# Patient Record
Sex: FEMALE | Race: WHITE | NOT HISPANIC OR LATINO | Employment: OTHER | URBAN - METROPOLITAN AREA
[De-identification: names, ages, dates, MRNs, and addresses within clinical notes are randomized per-mention and may not be internally consistent; named-entity substitution may affect disease eponyms.]

---

## 2017-02-06 ENCOUNTER — APPOINTMENT (OUTPATIENT)
Dept: MEDICAL GROUP | Facility: MEDICAL CENTER | Age: 62
End: 2017-02-06
Payer: COMMERCIAL

## 2017-06-15 ENCOUNTER — OFFICE VISIT (OUTPATIENT)
Dept: MEDICAL GROUP | Facility: PHYSICIAN GROUP | Age: 62
End: 2017-06-15
Payer: COMMERCIAL

## 2017-06-15 VITALS
HEART RATE: 58 BPM | SYSTOLIC BLOOD PRESSURE: 122 MMHG | BODY MASS INDEX: 22.13 KG/M2 | RESPIRATION RATE: 16 BRPM | HEIGHT: 67 IN | OXYGEN SATURATION: 95 % | TEMPERATURE: 99 F | WEIGHT: 141 LBS | DIASTOLIC BLOOD PRESSURE: 70 MMHG

## 2017-06-15 DIAGNOSIS — S83.511A RIGHT ACL TEAR: ICD-10-CM

## 2017-06-15 DIAGNOSIS — Z23 NEED FOR ZOSTER VACCINATION: ICD-10-CM

## 2017-06-15 DIAGNOSIS — M25.852 LEFT HIP IMPINGEMENT SYNDROME: ICD-10-CM

## 2017-06-15 DIAGNOSIS — Z01.419 WELL WOMAN EXAM: ICD-10-CM

## 2017-06-15 DIAGNOSIS — C50.919 MALIGNANT NEOPLASM OF FEMALE BREAST, UNSPECIFIED LATERALITY, UNSPECIFIED SITE OF BREAST: ICD-10-CM

## 2017-06-15 PROCEDURE — 99386 PREV VISIT NEW AGE 40-64: CPT | Performed by: FAMILY MEDICINE

## 2017-06-15 RX ORDER — CYCLOBENZAPRINE HCL 10 MG
10 TABLET ORAL 3 TIMES DAILY PRN
Qty: 30 TAB | Refills: 0 | Status: SHIPPED | OUTPATIENT
Start: 2017-06-15 | End: 2017-11-24

## 2017-06-15 ASSESSMENT — PATIENT HEALTH QUESTIONNAIRE - PHQ9: CLINICAL INTERPRETATION OF PHQ2 SCORE: 2

## 2017-06-15 ASSESSMENT — ENCOUNTER SYMPTOMS
CARDIOVASCULAR NEGATIVE: 1
PSYCHIATRIC NEGATIVE: 1
PALPITATIONS: 0
MYALGIAS: 0
CHILLS: 0
DIZZINESS: 0
FEVER: 0
BACK PAIN: 1
CONSTITUTIONAL NEGATIVE: 1
EYES NEGATIVE: 1
NECK PAIN: 0
RESPIRATORY NEGATIVE: 1
GASTROINTESTINAL NEGATIVE: 1
NEUROLOGICAL NEGATIVE: 1
CONSTIPATION: 0
COUGH: 0
HEADACHES: 0
HEMOPTYSIS: 0

## 2017-06-15 NOTE — MR AVS SNAPSHOT
"        Marielle Steele   6/15/2017 9:00 AM   Office Visit   MRN: [id number]    Department:  [facility]    Dept Phone:  [phone]    Description:  Female : 1955   Provider:  Werner Bhakta M.D.           Reason for Visit     Establish Care     Referral Needed           Allergies as of 6/15/2017     Allergen Noted Reactions    Lanolin 2015   Rash    rash    Tetracycline 2009   Rash    Latex 2015       Irritates skin      You were diagnosed with     Right ACL tear   [7125395]       Malignant neoplasm of female breast, unspecified laterality, unspecified site of breast (CMS-HCC)   [6925441]       Well woman exam   [092640]       Need for zoster vaccination   [[id number]]       Left hip impingement syndrome   [0922205]         Vital Signs     Blood Pressure Pulse Temperature Respirations Height Weight    122/70 mmHg 58 37.2 °C (99 °F) 16 1.702 m (5' 7.01\") 63.957 kg (141 lb)    Body Mass Index Oxygen Saturation Smoking Status             22.08 kg/m2 95% Never Smoker          Basic Information     Date Of Birth Sex Race Ethnicity Preferred Language    1955 Female White Non- English      Problem List              ICD-10-CM Priority Class Noted - Resolved    GERD (gastroesophageal reflux disease) K21.9   2015 - Present    Post menopausal syndrome N95.1   2015 - Present    Left hip impingement syndrome M25.852   2016 - Present      Health Maintenance        Date Due Completion Dates    IMM ZOSTER VACCINE 2015 ---    COLONOSCOPY 2017 (Done)    Override on 2007: Done    MAMMOGRAM 2017, 2016, 2015, 2013, 2012, 2010, 2010, 3/13/2009, 3/13/2009    PAP SMEAR 2018 (Done)    Override on 2015: Done    IMM DTaP/Tdap/Td Vaccine (2 - Td) 2026            Current Immunizations     Tdap Vaccine 2016 11:06 AM      Below and/or attached are the medications your provider " expects you to take. Review all of your home medications and newly ordered medications with your provider and/or pharmacist. Follow medication instructions as directed by your provider and/or pharmacist. Please keep your medication list with you and share with your provider. Update the information when medications are discontinued, doses are changed, or new medications (including over-the-counter products) are added; and carry medication information at all times in the event of emergency situations     Allergies:  LANOLIN - Rash     TETRACYCLINE - Rash     LATEX - (reactions not documented)               Medications  Valid as of: Yenny 15, 2017 -  9:12 AM    Generic Name Brand Name Tablet Size Instructions for use    Boswellia-Glucosamine-Vit D   Take  by mouth.        Calcium Carbonate-Vit D-Min   Take 1,500 mg by mouth every day.        Cholecalciferol   Take  by mouth.        Coenzyme Q10 (Cap) coenzyme Q-10 30 MG Take 60 mg by mouth every day.        Cyanocobalamin (Tab) VITAMIN B-12 500 MCG Take 500 mcg by mouth every day.        Cyclobenzaprine HCl (Tab) FLEXERIL 10 MG Take 1 Tab by mouth 3 times a day as needed.        Indomethacin (Cap CR) INDOCIN SR 75 MG Take 1 Cap by mouth every day. For 15 days        Multiple Vitamins-Minerals   Take  by mouth every day.        Zoster Vaccine Live (Recon Soln) ZOSTAVAX 54449 UNT/0.65ML Inject 0.65 mL as instructed Once for 1 dose.        .                 Medicines prescribed today were sent to:     [id number] - [place], [place] - [address] [facility] [address] [address] & [address] [facility] [address]    Phone: [phone] Fax: [phone]    Open 24 Hours?: No    [facility] - [place] [facility], [place] - [address]    [address] [address]    Phone: [phone] Fax: [phone]    Open 24 Hours?: No      Medication refill instructions:       If your prescription bottle  indicates you have medication refills left, it is not necessary to call your provider’s office. Please contact your pharmacy and they will refill your medication.    If your prescription bottle indicates you do not have any refills left, you may request refills at any time through one of the following ways: The online [employer] system (except Urgent Care), by calling your provider’s office, or by asking your pharmacy to contact your provider’s office with a refill request. Medication refills are processed only during regular business hours and may not be available until the next business day. Your provider may request additional information or to have a follow-up visit with you prior to refilling your medication.   *Please Note: Medication refills are assigned a new Rx number when refilled electronically. Your pharmacy may indicate that no refills were authorized even though a new prescription for the same medication is available at the pharmacy. Please request the medicine by name with the pharmacy before contacting your provider for a refill.        Your To Do List     Future Labs/Procedures Complete By Expires    MA-SCREEN MAMMO W/CAD-BILAT  As directed 6/15/2018      Referral     A referral request has been sent to our patient care coordination department. Please allow 3-5 business days for us to process this request and contact you either by phone or mail. If you do not hear from us by the 5th business day, please call us at [phone].           [employer] Access Code: Activation code not generated  Current [employer] Status: Active

## 2017-06-15 NOTE — PROGRESS NOTES
Subjective:      Marielle Steele is a 62 y.o. female who presents with Establish Care and Referral Needed            HPI Comments: New patient visit, has a history of breast cancer lump removal last year with 6 weeks of radiation, needs referral for f/u mammo and to see her onc  Has new insurance so needs new referral  1. Right ACL tear  In the past has done pt and doing ok    2. Malignant neoplasm of female breast, unspecified laterality, unspecified site of breast (CMS-HCC)    - REFERRAL TO HEMATOLOGY ONCOLOGY Referral to?: Other  - MA-SCREEN MAMMO W/CAD-BILAT; Future    3. Well woman exam    - REFERRAL TO GYNECOLOGY  - REFERRAL TO GASTROENTEROLOGY    4. Need for zoster vaccination    - zoster vaccine live, PF, (ZOSTAVAX) 22474 UNT/0.65ML injection; Inject 0.65 mL as instructed Once for 1 dose.  Dispense: 0.65 mL; Refill: 0    5. Left hip impingement syndrome  After pulling on saddle would like some muscle relaxers  - cyclobenzaprine (FLEXERIL) 10 MG Tab; Take 1 Tab by mouth 3 times a day as needed.  Dispense: 30 Tab; Refill: 0    Past Medical History:    Allergy                                                       Arthritis                                                     Heart murmur                                                  Fracture                                        1971            Comment:right elbow,   2006 left wrist    Carcinoma in situ of respiratory system                       Cancer (CMS-HCC)                                                Comment:right breast-just finished radiation    Heart burn                                                    Indigestion                                                 Past Surgical History:    LUMBAR DECOMPRESSION                             1993          MAMMOPLASTY AUGMENTATION                         1998          RHYTIDECTOMY                                     1/6/2009        Comment:Performed by R[facility]LAND SHAH at [facility]    BLEPHAROPLASTY                                   1/6/2009        Comment:Performed by RYLAND SHAH at SURGERY [facility]    [place] ENLARGE BREAST WITH IMPLANT                                   Comment:saline    HIP ARTHROSCOPY                                 Left 11/30/2016      Comment:Procedure: LEFT HIP ARTHROSCOPY, BONE SPUR                REMOVAL, LABRUM REPAIR ;  Surgeon: Bam Young M.D.;  Location: SURGERY [facility];                 Service:     Smoking Status: Never Smoker                      Smokeless Status: Never Used                        Alcohol Use: Yes                Comment: occasionally    Review of patient's family history indicates:    Other                          Mother                    GI                             Father                      Comment: Complications of cdiff    Diabetes                       Maternal Grandmother        Current outpatient prescriptions: •  zoster vaccine live, PF, (ZOSTAVAX) 52016 UNT/0.65ML injection, Inject 0.65 mL as instructed Once for 1 dose., Disp: 0.65 mL, Rfl: 0•  cyclobenzaprine (FLEXERIL) 10 MG Tab, Take 1 Tab by mouth 3 times a day as needed., Disp: 30 Tab, Rfl: 0•  indomethacin SR (INDOCIN SR) 75 MG Cap CR, Take 1 Cap by mouth every day. For 15 days, Disp: 15 Cap, Rfl: 0•  Calcium Carbonate-Vit D-Min (CALCIUM 1200 PO), Take 1,500 mg by mouth every day., Disp: , Rfl: •  cyanocobalamin (VITAMIN B-12) 500 MCG Tab, Take 500 mcg by mouth every day., Disp: , Rfl: •  Boswellia-Glucosamine-Vit D (GLUCOSAMINE COMPLEX PO), Take  by mouth., Disp: , Rfl: •  Cholecalciferol (VITAMIN D PO), Take  by mouth., Disp: , Rfl: •  coenzyme Q-10 30 MG capsule, Take 60 mg by mouth every day., Disp: , Rfl: •  DAILY MULTIVITAMIN PO, Take  by mouth every day., Disp: , Rfl:           Review of Systems   Constitutional: Negative.  Negative for fever and chills.        Past Medical History:    Allergy                                                        Arthritis                                                     Heart murmur                                                  Fracture                                        1971            Comment:right elbow,   2006 left wrist    Carcinoma in situ of respiratory system                       Cancer (CMS-HCC)                                                Comment:right breast-just finished radiation    Heart burn                                                    Indigestion                                                 Past Surgical History:    LUMBAR DECOMPRESSION                             1993          MAMMOPLASTY AUGMENTATION                         1998          RHYTIDECTOMY                                     1/6/2009        Comment:Performed by RYLAND SHAH at SURGERY [facility]    BLEPHAROPLASTY                                   1/6/2009        Comment:Performed by RYLAND SHAH at SURGERY [facility]    [place] ENLARGE BREAST WITH IMPLANT                                   Comment:saline    HIP ARTHROSCOPY                                 Left 11/30/2016      Comment:Procedure: LEFT HIP ARTHROSCOPY, BONE SPUR                REMOVAL, LABRUM REPAIR ;  Surgeon: Bam Young M.D.;  Location: SURGERY [facility];                 Service:     Smoking Status: Never Smoker                      Smokeless Status: Never Used                        Alcohol Use: Yes                Comment: occasionally    Review of patient's family history indicates:    Other                          Mother                    GI                             Father                      Comment: Complications of cdiff    Diabetes                       Maternal Grandmother       HENT: Negative.    Eyes: Negative.    Respiratory: Negative.  Negative for cough and hemoptysis.    Cardiovascular: Negative.  Negative for chest pain and palpitations.    "  Gastrointestinal: Negative.  Negative for constipation.   Genitourinary: Negative.  Negative for dysuria and urgency.   Musculoskeletal: Positive for back pain and joint pain. Negative for myalgias and neck pain.   Skin: Negative.  Negative for rash.   Neurological: Negative.  Negative for dizziness and headaches.   Endo/Heme/Allergies: Negative.    Psychiatric/Behavioral: Negative.  Negative for suicidal ideas.          Objective:     /70 mmHg  Pulse 58  Temp(Src) 37.2 °C (99 °F)  Resp 16  Ht 1.702 m (5' 7.01\")  Wt 63.957 kg (141 lb)  BMI 22.08 kg/m2  SpO2 95%     Physical Exam   Constitutional: She is oriented to person, place, and time. No distress.   HENT:   Head: Normocephalic and atraumatic.   Right Ear: External ear normal.   Left Ear: External ear normal.   Nose: Nose normal.   Mouth/Throat: Oropharynx is clear and moist. No oropharyngeal exudate.   Eyes: Pupils are equal, round, and reactive to light. Right eye exhibits no discharge. Left eye exhibits no discharge. No scleral icterus.   Neck: Normal range of motion. Neck supple. No JVD present. No tracheal deviation present. No thyromegaly present.   Cardiovascular: Normal rate, regular rhythm, normal heart sounds and intact distal pulses.  Exam reveals no gallop and no friction rub.    No murmur heard.  Pulmonary/Chest: Effort normal and breath sounds normal. No stridor. No respiratory distress. She has no wheezes. She has no rales. She exhibits no tenderness.   Abdominal: Soft. She exhibits no distension. There is no tenderness.   Musculoskeletal:        Lumbar back: She exhibits pain.        Back:    Lymphadenopathy:     She has no cervical adenopathy.   Neurological: She is alert and oriented to person, place, and time.   Skin: Skin is warm and dry. She is not diaphoretic.   Psychiatric: Judgment normal.   Nursing note and vitals reviewed.              Assessment/Plan:     1. Right ACL tear      2. Malignant neoplasm of female breast, " unspecified laterality, unspecified site of breast (CMS-HCC)    - REFERRAL TO HEMATOLOGY ONCOLOGY Referral to?: Other  - MA-SCREEN MAMMO W/CAD-BILAT; Future    3. Well woman exam    - REFERRAL TO GYNECOLOGY  - REFERRAL TO GASTROENTEROLOGY    4. Need for zoster vaccination    - zoster vaccine live, PF, (ZOSTAVAX) 35871 UNT/0.65ML injection; Inject 0.65 mL as instructed Once for 1 dose.  Dispense: 0.65 mL; Refill: 0    5. Left hip impingement syndrome    - cyclobenzaprine (FLEXERIL) 10 MG Tab; Take 1 Tab by mouth 3 times a day as needed.  Dispense: 30 Tab; Refill: 0

## 2017-09-06 ENCOUNTER — TELEPHONE (OUTPATIENT)
Dept: MEDICAL GROUP | Facility: PHYSICIAN GROUP | Age: 62
End: 2017-09-06

## 2017-09-06 NOTE — TELEPHONE ENCOUNTER
2. SPECIFIC Action To Be Taken: Pt would like to get referrals for the following places.     Oncologist Dr tovar     And for ortho mary referral for smita randhawa    3. Diagnosis/Clinical Reason for Request:      C50.919 Malignant neoplasm of female breast, unspecified laterality,     unspecified site of breast      And for ortho M16.12 (ICD-10-CM) - Primary osteoarthritis of left hip        4. Specialty & Provider Name/Lab/Imaging Location: Places     5. Is appointment scheduled for requested order/referral: yes - 10/31/17    Patient informed they will receive a return phone call from the office ONLY if there are any questions before processing their request. Advised to call back if they haven't received a call from the referral department in 5 days.

## 2017-11-27 ENCOUNTER — TELEPHONE (OUTPATIENT)
Dept: URGENT CARE | Facility: CLINIC | Age: 62
End: 2017-11-27

## 2017-11-27 DIAGNOSIS — R31.9 HEMATURIA, UNSPECIFIED TYPE: ICD-10-CM

## 2017-11-27 NOTE — TELEPHONE ENCOUNTER
Please notify patient of negative urine culture; no UTI.  May discontinue Rx Bactrim.  Need F/U PCP or urology (referral sent) as soon as available.

## 2017-11-29 ENCOUNTER — OFFICE VISIT (OUTPATIENT)
Dept: MEDICAL GROUP | Facility: PHYSICIAN GROUP | Age: 62
End: 2017-11-29
Payer: COMMERCIAL

## 2017-11-29 VITALS
HEART RATE: 62 BPM | TEMPERATURE: 98.6 F | DIASTOLIC BLOOD PRESSURE: 60 MMHG | SYSTOLIC BLOOD PRESSURE: 110 MMHG | WEIGHT: 144 LBS | RESPIRATION RATE: 14 BRPM | HEIGHT: 67 IN | OXYGEN SATURATION: 100 % | BODY MASS INDEX: 22.6 KG/M2

## 2017-11-29 DIAGNOSIS — Z87.440 HISTORY OF UTI: ICD-10-CM

## 2017-11-29 DIAGNOSIS — M25.852 LEFT HIP IMPINGEMENT SYNDROME: ICD-10-CM

## 2017-11-29 DIAGNOSIS — Z01.810 PREOP CARDIOVASCULAR EXAM: ICD-10-CM

## 2017-11-29 DIAGNOSIS — Z00.00 WELL ADULT EXAM: ICD-10-CM

## 2017-11-29 PROCEDURE — 99214 OFFICE O/P EST MOD 30 MIN: CPT | Performed by: FAMILY MEDICINE

## 2017-11-29 RX ORDER — CIPROFLOXACIN 500 MG/1
500 TABLET, FILM COATED ORAL 2 TIMES DAILY
Qty: 10 TAB | Refills: 0 | Status: SHIPPED | OUTPATIENT
Start: 2017-11-29 | End: 2017-12-04

## 2017-11-29 ASSESSMENT — ENCOUNTER SYMPTOMS
PSYCHIATRIC NEGATIVE: 1
COUGH: 0
GASTROINTESTINAL NEGATIVE: 1
HEADACHES: 0
NEUROLOGICAL NEGATIVE: 1
RESPIRATORY NEGATIVE: 1
FEVER: 0
NECK PAIN: 0
CARDIOVASCULAR NEGATIVE: 1
MYALGIAS: 0
PALPITATIONS: 0
CHILLS: 0
DIZZINESS: 0
EYES NEGATIVE: 1
CONSTITUTIONAL NEGATIVE: 1
HEMOPTYSIS: 0
CONSTIPATION: 0

## 2017-11-29 NOTE — PROGRESS NOTES
Subjective:      Marielle Steele is a 62 y.o. female who presents with Medical Clearance (hip replacement )            1. Left hip impingement syndrome  Continued pain and scheduled for a hip replacement from ortho    2. Preop cardiovascular exam  ekg normal  Labs ok except for blood and leukocytes in urine but had a uti for which she was treated, today urine dip normal  Last surgery was last year and no issues with anesthesia  No current cp or sob  No family history of malignant hyperthemia      3. History of UTI  Seen in  last week and given 3 days of bactrim  Still with leukocytes and blood in urine today will treat with cipro and have her give another sample next Monday to be clear for surgery on Wednesday      If on Monday her urine is clear we will have her ok for surgery    Past Medical History:  No date: Allergy  No date: Arthritis  No date: Cancer (CMS-[facility])      Comment: right breast-just finished radiation  No date: Carcinoma in situ of respiratory system  1971: Fracture      Comment: right elbow,   2006 left wrist  No date: Heart burn  No date: Heart murmur  No date: Indigestion  No date: Snoring  Past Surgical History:  11/30/2016: HIP ARTHROSCOPY Left      Comment: Procedure: LEFT HIP ARTHROSCOPY, BONE SPUR                REMOVAL, LABRUM REPAIR ;  Surgeon: Bam Young M.D.;  Location: SURGERY [facility];                 Service:   1/6/2009: RHYTIDECTOMY      Comment: Performed by RYLAND SHAH at SURGERY [facility]  1/6/2009: BLEPHAROPLASTY      Comment: Performed by R[facility]LAND SHAH at [facility]  1998: MAMMOPLASTY AUGMENTATION  1993: LUMBAR DECOMPRESSION  No date: PB ENLARGE BREAST WITH IMPLANT      Comment: saline  Smoking status: Never Smoker                                                              Smokeless tobacco: Never Used                      Alcohol use: Yes              Comment: occasionally    Review of  patient's family history indicates:    Other                          Mother                    GI                             Father                      Comment: Complications of cdiff    Diabetes                       Maternal Grandmother        No current outpatient prescriptions on file.    Patient was instructed on the use of medications, either prescriptions or OTC and informed on when the appropriate follow up time period should be. In addition, patient was also instructed that should any acute worsening occur that they should notify this clinic asap or call 911.            Review of Systems   Constitutional: Negative.  Negative for chills and fever.        Past Medical History:  No date: Allergy  No date: Arthritis  No date: Cancer (CMS-[facility])      Comment: right breast-just finished radiation  No date: Carcinoma in situ of respiratory system  1971: Fracture      Comment: right elbow,   2006 left wrist  No date: Heart burn  No date: Heart murmur  No date: Indigestion  No date: Snoring  Past Surgical History:  11/30/2016: HIP ARTHROSCOPY Left      Comment: Procedure: LEFT HIP ARTHROSCOPY, BONE SPUR                REMOVAL, LABRUM REPAIR ;  Surgeon: Bam Young M.D.;  Location: SURGERY [facility];                 Service:   1/6/2009: RHYTIDECTOMY      Comment: Performed by RYLAND SHAH at SURGERY [facility]  1/6/2009: BLEPHAROPLASTY      Comment: Performed by RYLAND SAHH at [facility]  1998: MAMMOPLASTY AUGMENTATION  1993: LUMBAR DECOMPRESSION  No date: PB ENLARGE BREAST WITH IMPLANT      Comment: saline  Smoking status: Never Smoker                                                              Smokeless tobacco: Never Used                      Alcohol use: Yes              Comment: occasionally    Review of patient's family history indicates:    Other                          Mother                    GI                              "Father                      Comment: Complications of cdiff    Diabetes                       Maternal Grandmother       HENT: Negative.    Eyes: Negative.    Respiratory: Negative.  Negative for cough and hemoptysis.    Cardiovascular: Negative.  Negative for chest pain and palpitations.   Gastrointestinal: Negative.  Negative for constipation.   Genitourinary: Negative.  Negative for dysuria and urgency.   Musculoskeletal: Positive for joint pain. Negative for myalgias and neck pain.   Skin: Negative.  Negative for rash.   Neurological: Negative.  Negative for dizziness and headaches.   Endo/Heme/Allergies: Negative.    Psychiatric/Behavioral: Negative.  Negative for suicidal ideas.          Objective:     /60   Pulse 62   Temp 37 °C (98.6 °F)   Resp 14   Ht 1.702 m (5' 7\")   Wt 65.3 kg (144 lb)   SpO2 100%   BMI 22.55 kg/m²      Physical Exam   Constitutional: She is oriented to person, place, and time. She appears well-developed and well-nourished. No distress.   HENT:   Head: Normocephalic and atraumatic.   Mouth/Throat: Oropharynx is clear and moist. No oropharyngeal exudate.   Eyes: Pupils are equal, round, and reactive to light.   Cardiovascular: Normal rate, regular rhythm, normal heart sounds and intact distal pulses.  Exam reveals no gallop and no friction rub.    No murmur heard.  Pulmonary/Chest: Effort normal and breath sounds normal. No respiratory distress. She has no wheezes. She has no rales. She exhibits no tenderness.   Musculoskeletal:        Left hip: She exhibits decreased range of motion.   Neurological: She is alert and oriented to person, place, and time.   Skin: She is not diaphoretic.   Psychiatric: She has a normal mood and affect. Her behavior is normal. Judgment and thought content normal.   Nursing note and vitals reviewed.              Assessment/Plan:     1. Left hip impingement syndrome      2. Preop cardiovascular exam      3. History of UTI        "

## 2017-12-04 ENCOUNTER — HOSPITAL ENCOUNTER (OUTPATIENT)
Facility: MEDICAL CENTER | Age: 62
End: 2017-12-04
Attending: NURSE PRACTITIONER
Payer: COMMERCIAL

## 2017-12-04 ENCOUNTER — OFFICE VISIT (OUTPATIENT)
Dept: MEDICAL GROUP | Facility: PHYSICIAN GROUP | Age: 62
End: 2017-12-04
Payer: COMMERCIAL

## 2017-12-04 VITALS
HEART RATE: 72 BPM | TEMPERATURE: 98 F | OXYGEN SATURATION: 99 % | HEIGHT: 67 IN | RESPIRATION RATE: 14 BRPM | DIASTOLIC BLOOD PRESSURE: 62 MMHG | SYSTOLIC BLOOD PRESSURE: 138 MMHG | BODY MASS INDEX: 22.6 KG/M2 | WEIGHT: 144 LBS

## 2017-12-04 DIAGNOSIS — N30.00 ACUTE CYSTITIS WITHOUT HEMATURIA: ICD-10-CM

## 2017-12-04 PROBLEM — N30.01 ACUTE CYSTITIS WITH HEMATURIA: Status: ACTIVE | Noted: 2017-12-04

## 2017-12-04 LAB
APPEARANCE UR: CLEAR
BILIRUB UR STRIP-MCNC: NORMAL MG/DL
COLOR UR AUTO: YELLOW
GLUCOSE UR STRIP.AUTO-MCNC: NORMAL MG/DL
KETONES UR STRIP.AUTO-MCNC: NORMAL MG/DL
LEUKOCYTE ESTERASE UR QL STRIP.AUTO: NORMAL
NITRITE UR QL STRIP.AUTO: NORMAL
PH UR STRIP.AUTO: 5 [PH] (ref 5–8)
PROT UR QL STRIP: NORMAL MG/DL
RBC UR QL AUTO: NORMAL
SP GR UR STRIP.AUTO: 1.01
UROBILINOGEN UR STRIP-MCNC: NORMAL MG/DL

## 2017-12-04 PROCEDURE — 87086 URINE CULTURE/COLONY COUNT: CPT

## 2017-12-04 PROCEDURE — 81002 URINALYSIS NONAUTO W/O SCOPE: CPT | Performed by: NURSE PRACTITIONER

## 2017-12-04 PROCEDURE — 99213 OFFICE O/P EST LOW 20 MIN: CPT | Performed by: NURSE PRACTITIONER

## 2017-12-04 ASSESSMENT — ENCOUNTER SYMPTOMS
BACK PAIN: 0
FEVER: 0
FLANK PAIN: 0
NAUSEA: 0
SPUTUM PRODUCTION: 0
CHILLS: 0
PALPITATIONS: 0
ABDOMINAL PAIN: 0
WHEEZING: 0
VOMITING: 0
SHORTNESS OF BREATH: 0
COUGH: 0
DIZZINESS: 0

## 2017-12-04 NOTE — PROGRESS NOTES
"Subjective:   Marielle Steele is a 62 y.o. female here today for follow up on UTI>     No problem-specific Assessment & Plan notes found for this encounter.     UTI   This is a new problem. Pertinent negatives include no abdominal pain, chills, fever, nausea, urinary symptoms or vomiting.       Current medicines (including changes today)  No current outpatient prescriptions on file.     No current facility-administered medications for this visit.      She  has a past medical history of Allergy; Arthritis; Cancer (CMS-HCC); Carcinoma in situ of respiratory system; Fracture (1971); Heart burn; Heart murmur; Indigestion; and Snoring. She also has no past medical history of Anesthesia; Anginal syndrome (CMS-HCC); Arrhythmia; Asthma; At risk for sleep apnea; Blood clotting disorder (CMS-HCC); Bowel habit changes; Breast cancer (CMS-HCC); Breath shortness; Bronchitis; Cataract; Cold; Congestive heart failure (CMS-HCC); Continuous ambulatory peritoneal dialysis status (CMS-HCC); COPD (chronic obstructive pulmonary disease) (CMS-HCC); Coughing blood; Dental disorder; Diabetes (CMS-HCC); Dialysis patient (CMS-HCC); Disorder of thyroid; Emphysema of lung (CMS-HCC); Glaucoma; Gynecological disorder; Heart valve disease; Hemorrhagic disorder (CMS-HCC); Hepatitis A; Hepatitis B; Hepatitis C; Hiatus hernia syndrome; High cholesterol; Hypertension; Infectious disease; Jaundice; Myocardial infarct; Pacemaker; Pneumonia; Pregnant; Psychiatric problem; Renal disorder; Rheumatic fever; Seizure (CMS-HCC); Sleep apnea; Stroke (CMS-HCC); Tuberculosis; Urinary bladder disorder; or Urinary incontinence.    Review of Systems   Constitutional: Negative for chills and fever.   Gastrointestinal: Negative for abdominal pain, nausea and vomiting.            Objective:     Blood pressure 138/62, pulse 72, temperature 36.7 °C (98 °F), resp. rate 14, height 1.702 m (5' 7\"), weight 65.3 kg (144 lb), SpO2 99 %. Body mass index is 22.55 kg/m². " ***    Physical Exam      Assessment and Plan:   The following treatment plan was discussed    1. Acute cystitis without hematuria  ***  - POCT Urinalysis  - URINE CULTURE(NEW); Future      Followup: No Follow-up on file.

## 2017-12-04 NOTE — PROGRESS NOTES
Subjective:   Marielle Steele is a 62 y.o. female here today for follow up on UTI.    Acute cystitis with hematuria  She was last seen on 11/29/17 by Dr Bhakta for preoperative clearance for hip replacement. Upon exam, she was noted to have leukocytes and blood present with U/A. She was treated with Cipro 500mg BID x 5 days. Today she states that she has completed full antibiotic course. She denies symptoms of dysuria, frequency, urgency, fever, chills, hematuria, or abdominal/fank pain. She does state that her surgery was rescheduled for 12/27/17.      Current medicines (including changes today)  No current outpatient prescriptions on file.     No current facility-administered medications for this visit.      She  has a past medical history of Allergy; Arthritis; Cancer (CMS-HCC); Carcinoma in situ of respiratory system; Fracture (1971); Heart burn; Heart murmur; Indigestion; and Snoring. She also has no past medical history of Anesthesia; Anginal syndrome (CMS-HCC); Arrhythmia; Asthma; At risk for sleep apnea; Blood clotting disorder (CMS-HCC); Bowel habit changes; Breast cancer (CMS-HCC); Breath shortness; Bronchitis; Cataract; Cold; Congestive heart failure (CMS-HCC); Continuous ambulatory peritoneal dialysis status (CMS-HCC); COPD (chronic obstructive pulmonary disease) (CMS-HCC); Coughing blood; Dental disorder; Diabetes (CMS-HCC); Dialysis patient (CMS-HCC); Disorder of thyroid; Emphysema of lung (CMS-HCC); Glaucoma; Gynecological disorder; Heart valve disease; Hemorrhagic disorder (CMS-HCC); Hepatitis A; Hepatitis B; Hepatitis C; Hiatus hernia syndrome; High cholesterol; Hypertension; Infectious disease; Jaundice; Myocardial infarct; Pacemaker; Pneumonia; Pregnant; Psychiatric problem; Renal disorder; Rheumatic fever; Seizure (CMS-[facility]); Sleep apnea; Stroke (CMS-HCC); Tuberculosis; Urinary bladder disorder; or Urinary incontinence.    Social History     Social History   • Marital status:       "Spouse name: N/A   • Number of children: N/A   • Years of education: N/A     Occupational History   • Not on file.     Social History Main Topics   • Smoking status: Never Smoker   • Smokeless tobacco: Never Used   • Alcohol use Yes      Comment: occasionally   • Drug use: No   • Sexual activity: Yes     Partners: Male     Birth control/ protection: Post-Menopausal     Other Topics Concern   • Not on file     Social History Narrative   • No narrative on file       Review of Systems   Constitutional: Negative for chills, fever and malaise/fatigue.   Respiratory: Negative for cough, sputum production, shortness of breath and wheezing.    Cardiovascular: Negative for chest pain, palpitations and leg swelling.   Genitourinary: Negative for dysuria, flank pain, frequency, hematuria and urgency.   Musculoskeletal: Negative for back pain.   Neurological: Negative for dizziness.        Objective:     Blood pressure 138/62, pulse 72, temperature 36.7 °C (98 °F), resp. rate 14, height 1.702 m (5' 7\"), weight 65.3 kg (144 lb), SpO2 99 %. Body mass index is 22.55 kg/m².     Physical Exam   Constitutional: She is oriented to person, place, and time and well-developed, well-nourished, and in no distress. No distress.   HENT:   Head: Normocephalic and atraumatic.   Eyes: Conjunctivae and EOM are normal. Pupils are equal, round, and reactive to light.   Neck: Normal range of motion. Neck supple.   Cardiovascular: Normal rate, normal heart sounds and intact distal pulses.  Exam reveals no friction rub.    No murmur heard.  Pulmonary/Chest: Effort normal and breath sounds normal. No respiratory distress. She has no wheezes.   Abdominal: There is no CVA tenderness.   Musculoskeletal: Normal range of motion.   Neurological: She is alert and oriented to person, place, and time. Gait normal.   Skin: Skin is warm and dry.   Psychiatric: Mood, memory, affect and judgment normal.       Assessment and Plan:   The following treatment plan was " discussed    1. Acute cystitis without hematuria  U/A results negative for leukocytes, nitrates, or blood. Will send urine for culture and advised that if results are negative for bacteria, patient will be cleared for surgery. Encouraged to consume at least 2L of water daily, maintain proper feminine hygiene, and void following intercourse.   - POCT Urinalysis  - URINE CULTURE(NEW); Future    Followup: Return if symptoms worsen or fail to improve.

## 2017-12-04 NOTE — ASSESSMENT & PLAN NOTE
She was last seen on 11/29/17 by Dr Bhakta for preoperative clearance for hip replacement. Upon exam, she was noted to have leukocytes and blood present with U/A. She was treated with Cipro 500mg BID x 5 days. Today she states that she has completed full antibiotic course. She denies symptoms of dysuria, frequency, urgency, fever, chills, hematuria, or abdominal/fank pain. She does state that her surgery was rescheduled for 12/27/17.

## 2017-12-06 ENCOUNTER — TELEPHONE (OUTPATIENT)
Dept: MEDICAL GROUP | Facility: PHYSICIAN GROUP | Age: 62
End: 2017-12-06

## 2017-12-06 DIAGNOSIS — R31.9 HEMATURIA, UNSPECIFIED TYPE: ICD-10-CM

## 2017-12-06 LAB
BACTERIA UR CULT: NORMAL
SIGNIFICANT IND 70042: NORMAL
SOURCE SOURCE: NORMAL

## 2017-12-06 NOTE — TELEPHONE ENCOUNTER
Phone Number Called: [phone] (home)       Message: LEft vm letting patient know about ua     Left Message for patient to call back: no

## 2017-12-19 ENCOUNTER — TELEPHONE (OUTPATIENT)
Dept: MEDICAL GROUP | Facility: PHYSICIAN GROUP | Age: 62
End: 2017-12-19

## 2017-12-19 NOTE — TELEPHONE ENCOUNTER
Patient called and was asking if her urology referral could be sent too a facility at [facility] in [place]. Phone [phone]. I emailed the person who sent the referral asking for this to be switched and I also called the patient and let her know what I did and I also gave her the referral dept number so she could call them and ask for it to be switched.

## 2017-12-20 PROBLEM — N20.1 LEFT URETERAL STONE: Status: ACTIVE | Noted: 2017-12-20

## 2017-12-20 NOTE — TELEPHONE ENCOUNTER
New referral for urology was done by . Called the referral dept to give them information on where the referral needs to go.   [facility] office phone # [phone] and fax [phone]. Left a message with yesenia to call back so I can give him that information. Patient has appointment 12/20/17 with them.

## 2017-12-29 PROBLEM — N20.0 RENAL STONES: Status: ACTIVE | Noted: 2017-12-29

## 2018-01-17 PROBLEM — M16.11 ARTHRITIS OF RIGHT HIP: Status: ACTIVE | Noted: 2018-01-17

## 2018-01-17 PROBLEM — M16.12 ARTHRITIS OF LEFT HIP: Status: ACTIVE | Noted: 2018-01-17

## 2018-02-06 PROBLEM — R26.2 DIFFICULTY WALKING: Status: ACTIVE | Noted: 2018-02-06

## 2018-02-06 PROBLEM — Z47.1 AFTERCARE FOLLOWING LEFT HIP JOINT REPLACEMENT SURGERY: Status: ACTIVE | Noted: 2018-02-06

## 2018-02-06 PROBLEM — M16.12 PRIMARY OSTEOARTHRITIS OF LEFT HIP: Status: ACTIVE | Noted: 2018-02-06

## 2018-02-06 PROBLEM — M25.552 LEFT HIP PAIN: Status: ACTIVE | Noted: 2018-02-06

## 2018-02-06 PROBLEM — M25.652 STIFFNESS OF LEFT HIP JOINT: Status: ACTIVE | Noted: 2018-02-06

## 2018-02-06 PROBLEM — Z96.642 AFTERCARE FOLLOWING LEFT HIP JOINT REPLACEMENT SURGERY: Status: ACTIVE | Noted: 2018-02-06

## 2018-05-16 ENCOUNTER — TELEPHONE (OUTPATIENT)
Dept: MEDICAL GROUP | Facility: PHYSICIAN GROUP | Age: 63
End: 2018-05-16

## 2018-05-16 DIAGNOSIS — Z00.00 WELL ADULT EXAM: ICD-10-CM

## 2018-05-16 NOTE — TELEPHONE ENCOUNTER
Patient is needing to get her colonoscopy re ordered it expires in June and she cant be seen until July or August.

## 2018-07-17 ENCOUNTER — TELEPHONE (OUTPATIENT)
Dept: MEDICAL GROUP | Facility: PHYSICIAN GROUP | Age: 63
End: 2018-07-17

## 2018-07-17 NOTE — TELEPHONE ENCOUNTER
Due to pt having to reschedule the colonscopy, her referral will  before her appt on 18.     She is requesting a new referral.

## 2018-12-10 ENCOUNTER — OFFICE VISIT (OUTPATIENT)
Dept: MEDICAL GROUP | Facility: PHYSICIAN GROUP | Age: 63
End: 2018-12-10
Payer: COMMERCIAL

## 2018-12-10 VITALS
TEMPERATURE: 97.8 F | HEIGHT: 67 IN | DIASTOLIC BLOOD PRESSURE: 62 MMHG | WEIGHT: 140 LBS | BODY MASS INDEX: 21.97 KG/M2 | SYSTOLIC BLOOD PRESSURE: 122 MMHG | RESPIRATION RATE: 16 BRPM | HEART RATE: 63 BPM | OXYGEN SATURATION: 97 %

## 2018-12-10 DIAGNOSIS — Z87.442 HISTORY OF KIDNEY STONES: ICD-10-CM

## 2018-12-10 DIAGNOSIS — L98.9 SKIN LESION: ICD-10-CM

## 2018-12-10 DIAGNOSIS — Z96.649 HISTORY OF HIP REPLACEMENT, UNSPECIFIED LATERALITY: ICD-10-CM

## 2018-12-10 DIAGNOSIS — Z00.00 WELL ADULT EXAM: ICD-10-CM

## 2018-12-10 PROBLEM — M16.11 ARTHRITIS OF RIGHT HIP: Status: RESOLVED | Noted: 2018-01-17 | Resolved: 2018-12-10

## 2018-12-10 PROBLEM — M16.12 ARTHRITIS OF LEFT HIP: Status: RESOLVED | Noted: 2018-01-17 | Resolved: 2018-12-10

## 2018-12-10 PROBLEM — N20.1 LEFT URETERAL STONE: Status: RESOLVED | Noted: 2017-12-20 | Resolved: 2018-12-10

## 2018-12-10 PROBLEM — N20.0 RENAL STONES: Status: RESOLVED | Noted: 2017-12-29 | Resolved: 2018-12-10

## 2018-12-10 PROBLEM — Z96.642 AFTERCARE FOLLOWING LEFT HIP JOINT REPLACEMENT SURGERY: Status: RESOLVED | Noted: 2018-02-06 | Resolved: 2018-12-10

## 2018-12-10 PROBLEM — M16.12 PRIMARY OSTEOARTHRITIS OF LEFT HIP: Status: RESOLVED | Noted: 2018-02-06 | Resolved: 2018-12-10

## 2018-12-10 PROBLEM — Z47.1 AFTERCARE FOLLOWING LEFT HIP JOINT REPLACEMENT SURGERY: Status: RESOLVED | Noted: 2018-02-06 | Resolved: 2018-12-10

## 2018-12-10 PROCEDURE — 99214 OFFICE O/P EST MOD 30 MIN: CPT | Performed by: FAMILY MEDICINE

## 2018-12-10 ASSESSMENT — ENCOUNTER SYMPTOMS
BRUISES/BLEEDS EASILY: 0
HEADACHES: 0
NAUSEA: 0
DOUBLE VISION: 0
DEPRESSION: 0
PALPITATIONS: 0
HEARTBURN: 0
DIZZINESS: 0
BLURRED VISION: 0
CHILLS: 0
CARDIOVASCULAR NEGATIVE: 1
HEMOPTYSIS: 0
CONSTITUTIONAL NEGATIVE: 1
FEVER: 0
NEUROLOGICAL NEGATIVE: 1
COUGH: 0
GASTROINTESTINAL NEGATIVE: 1
PSYCHIATRIC NEGATIVE: 1
MYALGIAS: 0
RESPIRATORY NEGATIVE: 1
TINGLING: 0
EYES NEGATIVE: 1

## 2018-12-10 NOTE — PROGRESS NOTES
Subjective:      Marielle Steele is a 63 y.o. female who presents with Pain            Yearly visit, in the past year she has had a left hip replacement and doing well from and one episode of kidney stones. Would like some yearly labs      1. Well adult exam    - COMP METABOLIC PANEL; Future  - FREE THYROXINE; Future  - Lipid Profile; Future  - TRIIDOTHYRONINE; Future  - TSH; Future  - VITAMIN D,25 HYDROXY; Future  - CBC WITH DIFFERENTIAL; Future    2. Skin lesion    - REFERRAL TO DERMATOLOGY    3. History of hip replacement, unspecified laterality      4. History of kidney stones      Past Medical History:  No date: Allergy  No date: Arthritis  No date: Cancer (HCC)      Comment:  right breast-just finished radiation  1971: Fracture      Comment:  right elbow,   2006 left wrist  No date: Heart burn  No date: Heart murmur  No date: Indigestion  12/29/2017: Renal stones  No date: Snoring  Past Surgical History:  7/23/2018: EXTRACORPOREAL SHOCKWAVE LITHOTRIPSY; Bilateral      Comment:  Procedure: BILATERAL EXTRACORPOREAL SHOCKWAVE                LITHOTRIPSY, NO STENT;  Surgeon: Victor Manuel Ty M.D.;  Location: [facility];  Service: Urology  1/17/2018: HIP ARTH ANTERIOR TOTAL; Left      Comment:  Procedure: ANTERIOR LEFT HIP REPLACEMENT;  Surgeon:                Bam Young M.D.;  Location: [facility];                 Service: Orthopedics  12/21/2017: URETEROSCOPY; Left      Comment:  Procedure: LEFT URETEROSCOPY, LASER LITHOTRIPSY LEFT,                STENT PLACEMENT;  Surgeon: Victor Manuel Ty M.D.;                 Location: [facility];  Service: Urology  11/30/2016: HIP ARTHROSCOPY; Left      Comment:  Procedure: LEFT HIP ARTHROSCOPY, BONE SPUR REMOVAL,                LABRUM REPAIR ;  Surgeon: Bam Young M.D.;  Location:               [facility];  Service:   1/6/2009: RHYTIDECTOMY      Comment:  Performed by RYLAND SHAH at [facility]                 ORS  1/6/2009: BLEPHAROPLASTY      Comment:  Performed by RYLAND SHAH at SURGERY [facility]                ORS  1998: MAMMOPLASTY AUGMENTATION  1993: LUMBAR DECOMPRESSION  No date: PB ENLARGE BREAST WITH IMPLANT      Comment:  saline  Smoking status: Never Smoker                                                              Smokeless tobacco: Never Used                      Alcohol use: Yes              Comment: occasionally    Review of patient's family history indicates:  Problem: Other      Relation: Mother       Age of Onset: (Not Specified)   Problem: GI      Relation: Father       Age of Onset: 84       Comment: Complications of cdiff  Problem: Diabetes      Relation: Maternal Grandmother       Age of Onset: (Not Specified)       No current outpatient prescriptions on file.    Patient was instructed on the use of medications, either prescriptions or OTC and informed on when the appropriate follow up time period should be. In addition, patient was also instructed that should any acute worsening occur that they should notify this clinic asap or call 911.            Review of Systems   Constitutional: Negative.  Negative for chills and fever.   HENT: Negative.  Negative for hearing loss.    Eyes: Negative.  Negative for blurred vision and double vision.   Respiratory: Negative.  Negative for cough and hemoptysis.    Cardiovascular: Negative.  Negative for chest pain and palpitations.   Gastrointestinal: Negative.  Negative for heartburn and nausea.   Genitourinary: Negative.  Negative for dysuria.   Musculoskeletal: Positive for joint pain. Negative for myalgias.   Skin: Negative.  Negative for rash.   Neurological: Negative.  Negative for dizziness, tingling and headaches.   Endo/Heme/Allergies: Negative.  Does not bruise/bleed easily.   Psychiatric/Behavioral: Negative.  Negative for depression and suicidal ideas.   All other systems reviewed and are negative.         Objective:     /62  "(BP Location: Right arm, Patient Position: Sitting)   Pulse 63   Temp 36.6 °C (97.8 °F)   Resp 16   Ht 1.702 m (5' 7\")   Wt 63.5 kg (140 lb)   SpO2 97%   BMI 21.93 kg/m²      Physical Exam   Constitutional: She is oriented to person, place, and time. She appears well-developed and well-nourished. No distress.   HENT:   Head: Normocephalic and atraumatic.   Mouth/Throat: Oropharynx is clear and moist. No oropharyngeal exudate.   Eyes: Pupils are equal, round, and reactive to light.   Cardiovascular: Normal rate, regular rhythm, normal heart sounds and intact distal pulses.  Exam reveals no gallop and no friction rub.    No murmur heard.  Pulmonary/Chest: Effort normal and breath sounds normal. No respiratory distress. She has no wheezes. She has no rales. She exhibits no tenderness.   Neurological: She is alert and oriented to person, place, and time.   Skin: She is not diaphoretic.   Psychiatric: She has a normal mood and affect. Her behavior is normal. Judgment and thought content normal.   Nursing note and vitals reviewed.              Assessment/Plan:     1. Well adult exam    - COMP METABOLIC PANEL; Future  - FREE THYROXINE; Future  - Lipid Profile; Future  - TRIIDOTHYRONINE; Future  - TSH; Future  - VITAMIN D,25 HYDROXY; Future  - CBC WITH DIFFERENTIAL; Future    2. Skin lesion    - REFERRAL TO DERMATOLOGY    3. History of hip replacement, unspecified laterality      4. History of kidney stones      "

## 2018-12-20 ENCOUNTER — TELEPHONE (OUTPATIENT)
Dept: MEDICAL GROUP | Facility: PHYSICIAN GROUP | Age: 63
End: 2018-12-20

## 2018-12-20 NOTE — TELEPHONE ENCOUNTER
Phone Number Called: [phone] (home)       Message: Left message for patient to call back.       Left Message for patient to call back: yes

## 2018-12-21 ENCOUNTER — TELEPHONE (OUTPATIENT)
Dept: MEDICAL GROUP | Facility: PHYSICIAN GROUP | Age: 63
End: 2018-12-21

## 2018-12-21 NOTE — TELEPHONE ENCOUNTER
Patient called in to get her lab results. Per Dr. Bhakta's note, I informed her everything was ok. She then asked for a copy of them be emailed to her. I told her because of HIPPA we couldn't do that and she would need to sign the release form. She then wanted the release emailed to her. I informed her again HIPPA doesn't allow us to this. I did recommend to her to contact [facility] in [place] for her lab results as she lives down there and would be a lot easier since she had them drawn there. Otherwise, she would have to come into the office. She understood.

## 2019-01-23 ENCOUNTER — OFFICE VISIT (OUTPATIENT)
Dept: MEDICAL GROUP | Facility: PHYSICIAN GROUP | Age: 64
End: 2019-01-23
Payer: COMMERCIAL

## 2019-01-23 VITALS
BODY MASS INDEX: 22.76 KG/M2 | TEMPERATURE: 97.6 F | RESPIRATION RATE: 12 BRPM | HEART RATE: 59 BPM | WEIGHT: 145 LBS | DIASTOLIC BLOOD PRESSURE: 68 MMHG | OXYGEN SATURATION: 98 % | SYSTOLIC BLOOD PRESSURE: 122 MMHG | HEIGHT: 67 IN

## 2019-01-23 DIAGNOSIS — Z00.00 WELL ADULT EXAM: ICD-10-CM

## 2019-01-23 DIAGNOSIS — K21.9 GASTROESOPHAGEAL REFLUX DISEASE WITHOUT ESOPHAGITIS: ICD-10-CM

## 2019-01-23 PROCEDURE — 99396 PREV VISIT EST AGE 40-64: CPT | Performed by: FAMILY MEDICINE

## 2019-01-23 ASSESSMENT — ENCOUNTER SYMPTOMS
COUGH: 0
NEUROLOGICAL NEGATIVE: 1
CARDIOVASCULAR NEGATIVE: 1
CHILLS: 0
CONSTITUTIONAL NEGATIVE: 1
HEADACHES: 0
BLURRED VISION: 0
PSYCHIATRIC NEGATIVE: 1
MYALGIAS: 0
RESPIRATORY NEGATIVE: 1
DIZZINESS: 0
DOUBLE VISION: 0
TINGLING: 0
FEVER: 0
PALPITATIONS: 0
HEARTBURN: 1
HEMOPTYSIS: 0
EYES NEGATIVE: 1
DEPRESSION: 0
NAUSEA: 0
MUSCULOSKELETAL NEGATIVE: 1
BRUISES/BLEEDS EASILY: 0

## 2019-01-23 NOTE — PROGRESS NOTES
Subjective:      Marielle Steele is a 63 y.o. female who presents with GI Problem            Doing well other than some gerd sx. Advised on otc means  Labs gone over and great cholesterol profile  1. Gastroesophageal reflux disease without esophagitis  Patient is currently being treated for gerd, taking meds with no new symptoms or side effects, is trying to modify diet with decreased acidic foods, caffeine, etoh.  controlled      2. Well adult exam      Past Medical History:  No date: Allergy  No date: Arthritis  No date: Cancer (HCC)      Comment:  right breast-just finished radiation  1971: Fracture      Comment:  right elbow,   2006 left wrist  No date: Heart burn  No date: Heart murmur  No date: Indigestion  12/29/2017: Renal stones  No date: Snoring  Past Surgical History:  7/23/2018: EXTRACORPOREAL SHOCKWAVE LITHOTRIPSY; Bilateral      Comment:  Procedure: BILATERAL EXTRACORPOREAL SHOCKWAVE                LITHOTRIPSY, NO STENT;  Surgeon: Victor Manuel Ty M.D.;  Location: [facility];  Service: Urology  1/17/2018: HIP ARTH ANTERIOR TOTAL; Left      Comment:  Procedure: ANTERIOR LEFT HIP REPLACEMENT;  Surgeon:                Bam Young M.D.;  Location: [facility];                 Service: Orthopedics  12/21/2017: URETEROSCOPY; Left      Comment:  Procedure: LEFT URETEROSCOPY, LASER LITHOTRIPSY LEFT,                STENT PLACEMENT;  Surgeon: Victor Manuel Ty M.D.;                 Location: [facility];  Service: Urology  11/30/2016: HIP ARTHROSCOPY; Left      Comment:  Procedure: LEFT HIP ARTHROSCOPY, BONE SPUR REMOVAL,                LABRUM REPAIR ;  Surgeon: Bam Young M.D.;  Location:               [facility];  Service:   1/6/2009: RHYTIDECTOMY      Comment:  Performed by RYLAND SHAH at [facility]  1/6/2009: BLEPHAROPLASTY      Comment:  Performed by RYLAND SHAH at [facility]                 "ORS  1998: MAMMOPLASTY AUGMENTATION  1993: LUMBAR DECOMPRESSION  No date: PB ENLARGE BREAST WITH IMPLANT      Comment:  saline  Smoking status: Never Smoker                                                              Smokeless tobacco: Never Used                      Alcohol use: Yes              Comment: occasionally    Review of patient's family history indicates:  Problem: Other      Relation: Mother       Age of Onset: (Not Specified)   Problem: GI      Relation: Father       Age of Onset: 84       Comment: Complications of cdiff  Problem: Diabetes      Relation: Maternal Grandmother       Age of Onset: (Not Specified)       No current outpatient prescriptions on file.    Patient was instructed on the use of medications, either prescriptions or OTC and informed on when the appropriate follow up time period should be. In addition, patient was also instructed that should any acute worsening occur that they should notify this clinic asap or call 911.            Review of Systems   Constitutional: Negative.  Negative for chills and fever.   HENT: Negative.  Negative for hearing loss.    Eyes: Negative.  Negative for blurred vision and double vision.   Respiratory: Negative.  Negative for cough and hemoptysis.    Cardiovascular: Negative.  Negative for chest pain and palpitations.   Gastrointestinal: Positive for heartburn. Negative for nausea.   Genitourinary: Negative.  Negative for dysuria.   Musculoskeletal: Negative.  Negative for myalgias.   Skin: Negative.  Negative for rash.   Neurological: Negative.  Negative for dizziness, tingling and headaches.   Endo/Heme/Allergies: Negative.  Does not bruise/bleed easily.   Psychiatric/Behavioral: Negative.  Negative for depression and suicidal ideas.   All other systems reviewed and are negative.         Objective:     /68 (BP Location: Right arm, Patient Position: Sitting)   Pulse (!) 59   Temp 36.4 °C (97.6 °F)   Resp 12   Ht 1.702 m (5' 7\")   Wt 65.8 kg " (145 lb)   SpO2 98%   BMI 22.71 kg/m²      Physical Exam   Constitutional: She is oriented to person, place, and time. She appears well-developed and well-nourished. No distress.   HENT:   Head: Normocephalic and atraumatic.   Mouth/Throat: Oropharynx is clear and moist. No oropharyngeal exudate.   Eyes: Pupils are equal, round, and reactive to light.   Cardiovascular: Normal rate, regular rhythm, normal heart sounds and intact distal pulses.  Exam reveals no gallop and no friction rub.    No murmur heard.  Pulmonary/Chest: Effort normal and breath sounds normal. No respiratory distress. She has no wheezes. She has no rales. She exhibits no tenderness.   Neurological: She is alert and oriented to person, place, and time.   Skin: She is not diaphoretic.   Psychiatric: She has a normal mood and affect. Her behavior is normal. Judgment and thought content normal.   Nursing note and vitals reviewed.              Assessment/Plan:     1. Gastroesophageal reflux disease without esophagitis      2. Well adult exam

## 2019-02-06 ENCOUNTER — APPOINTMENT (RX ONLY)
Dept: URBAN - METROPOLITAN AREA CLINIC 31 | Facility: CLINIC | Age: 64
Setting detail: DERMATOLOGY
End: 2019-02-06

## 2019-02-06 DIAGNOSIS — L81.4 OTHER MELANIN HYPERPIGMENTATION: ICD-10-CM

## 2019-02-06 DIAGNOSIS — L82.0 INFLAMED SEBORRHEIC KERATOSIS: ICD-10-CM

## 2019-02-06 DIAGNOSIS — L82.1 OTHER SEBORRHEIC KERATOSIS: ICD-10-CM

## 2019-02-06 DIAGNOSIS — L44.8 OTHER SPECIFIED PAPULOSQUAMOUS DISORDERS: ICD-10-CM

## 2019-02-06 PROCEDURE — 99202 OFFICE O/P NEW SF 15 MIN: CPT | Mod: 25

## 2019-02-06 PROCEDURE — ? COUNSELING

## 2019-02-06 PROCEDURE — ? BENIGN DESTRUCTION

## 2019-02-06 PROCEDURE — 17110 DESTRUCTION B9 LES UP TO 14: CPT

## 2019-02-06 ASSESSMENT — LOCATION DETAILED DESCRIPTION DERM
LOCATION DETAILED: LEFT DISTAL DORSAL FOREARM
LOCATION DETAILED: RIGHT PROXIMAL DORSAL FOREARM
LOCATION DETAILED: LEFT CENTRAL TEMPLE
LOCATION DETAILED: RIGHT ELBOW
LOCATION DETAILED: LEFT ELBOW
LOCATION DETAILED: RIGHT DISTAL DORSAL FOREARM
LOCATION DETAILED: LEFT PROXIMAL DORSAL FOREARM

## 2019-02-06 ASSESSMENT — LOCATION ZONE DERM
LOCATION ZONE: FACE
LOCATION ZONE: ARM

## 2019-02-06 ASSESSMENT — LOCATION SIMPLE DESCRIPTION DERM
LOCATION SIMPLE: LEFT TEMPLE
LOCATION SIMPLE: LEFT ELBOW
LOCATION SIMPLE: RIGHT FOREARM
LOCATION SIMPLE: RIGHT ELBOW
LOCATION SIMPLE: LEFT FOREARM

## 2019-02-06 NOTE — PROCEDURE: BENIGN DESTRUCTION
Medical Necessity Clause: This procedure was medically necessary because the lesions that were treated were:
Treatment Number (Will Not Render If 0): 0
Anesthesia Volume In Cc: 0.5
Include Z78.9 (Other Specified Conditions Influencing Health Status) As An Associated Diagnosis?: No
Consent: The patient's consent was obtained including but not limited to risks of crusting, scabbing, blistering, scarring, darker or lighter pigmentary change, recurrence, incomplete removal and infection.
Detail Level: Detailed
Medical Necessity Information: It is in your best interest to select a reason for this procedure from the list below. All of these items fulfill various CMS LCD requirements except the new and changing color options.
Render Post-Care Instructions In Note?: yes
Post-Care Instructions: I reviewed with the patient in detail post-care instructions. Patient is to wear sunprotection, and avoid picking at any of the treated lesions. Pt may apply Vaseline to crusted or scabbing areas.

## 2019-08-14 ENCOUNTER — TELEPHONE (OUTPATIENT)
Dept: MEDICAL GROUP | Facility: PHYSICIAN GROUP | Age: 64
End: 2019-08-14

## 2019-08-14 NOTE — TELEPHONE ENCOUNTER
Spoke with patient via pc, relayed to her providers recommendations.  If sxs persist she may need to be seen.

## 2019-08-14 NOTE — TELEPHONE ENCOUNTER
Patient called and went to [facility] urgent care and was treat with Zithromax for cough and sore throat. And she feels little better but she now has diarrhea now with the medication. She is not sure if she will need to come in a get reevaluated or she can try something at home to help with the diarrhea

## 2019-09-03 ENCOUNTER — OFFICE VISIT (OUTPATIENT)
Dept: MEDICAL GROUP | Facility: PHYSICIAN GROUP | Age: 64
End: 2019-09-03
Payer: COMMERCIAL

## 2019-09-03 VITALS
WEIGHT: 145.5 LBS | TEMPERATURE: 97.2 F | RESPIRATION RATE: 12 BRPM | DIASTOLIC BLOOD PRESSURE: 70 MMHG | BODY MASS INDEX: 22.84 KG/M2 | HEART RATE: 62 BPM | OXYGEN SATURATION: 97 % | HEIGHT: 67 IN | SYSTOLIC BLOOD PRESSURE: 116 MMHG

## 2019-09-03 DIAGNOSIS — R05.9 COUGH: ICD-10-CM

## 2019-09-03 PROCEDURE — 99214 OFFICE O/P EST MOD 30 MIN: CPT | Performed by: FAMILY MEDICINE

## 2019-09-03 RX ORDER — METHYLPREDNISOLONE 4 MG/1
TABLET ORAL
Qty: 21 TAB | Refills: 0 | Status: SHIPPED | OUTPATIENT
Start: 2019-09-03 | End: 2020-09-11

## 2019-09-03 ASSESSMENT — ENCOUNTER SYMPTOMS
FEVER: 0
DIZZINESS: 0
HEADACHES: 0
NAUSEA: 0
CARDIOVASCULAR NEGATIVE: 1
PSYCHIATRIC NEGATIVE: 1
CHILLS: 0
CONSTITUTIONAL NEGATIVE: 1
EYES NEGATIVE: 1
COUGH: 1
MYALGIAS: 0
DOUBLE VISION: 0
NEUROLOGICAL NEGATIVE: 1
MUSCULOSKELETAL NEGATIVE: 1
BRUISES/BLEEDS EASILY: 0
TINGLING: 0
HEMOPTYSIS: 0
GASTROINTESTINAL NEGATIVE: 1
DEPRESSION: 0
PALPITATIONS: 0
BLURRED VISION: 0
HEARTBURN: 0

## 2019-09-03 NOTE — PROGRESS NOTES
Subjective:      Marielle Steele is a 64 y.o. female who presents with Cough (follow up)            1. Cough  Recent uri treated with abx and cough still present but now more dry with less production  No soboe with exercise and no wheezing or fever  Exam today normal except for dry cough   will treat with steroids for possible irritation from uri and check cxr  - methylPREDNISolone (MEDROL DOSEPAK) 4 MG Tablet Therapy Pack; As directed on the packaging label.  Dispense: 21 Tab; Refill: 0  - DX-CHEST-2 VIEWS; Future    Past Medical History:  No date: Allergy  No date: Arthritis  No date: Cancer (HCC)      Comment:  right breast-just finished radiation  1971: Fracture      Comment:  right elbow,   2006 left wrist  No date: Heart burn  No date: Heart murmur  No date: Indigestion  12/29/2017: Renal stones  No date: Snoring  Past Surgical History:  7/23/2018: EXTRACORPOREAL SHOCKWAVE LITHOTRIPSY; Bilateral      Comment:  Procedure: BILATERAL EXTRACORPOREAL SHOCKWAVE                LITHOTRIPSY, NO STENT;  Surgeon: Victor Manuel Ty M.D.;  Location: [facility];  Service: Urology  1/17/2018: HIP ARTH ANTERIOR TOTAL; Left      Comment:  Procedure: ANTERIOR LEFT HIP REPLACEMENT;  Surgeon:                Bam Young M.D.;  Location: [facility];                 Service: Orthopedics  12/21/2017: URETEROSCOPY; Left      Comment:  Procedure: LEFT URETEROSCOPY, LASER LITHOTRIPSY LEFT,                STENT PLACEMENT;  Surgeon: Victor Manuel Ty M.D.;                 Location: [facility];  Service: Urology  11/30/2016: HIP ARTHROSCOPY; Left      Comment:  Procedure: LEFT HIP ARTHROSCOPY, BONE SPUR REMOVAL,                LABRUM REPAIR ;  Surgeon: Bam Young M.D.;  Location:               [facility];  Service:   1/6/2009: RHYTIDECTOMY      Comment:  Performed by RYLAND SHAH at [facility]  1/6/2009: BLEPHAROPLASTY      Comment:  Performed by  RYLAND SHAH at SURGERY [facility]                ORS  1998: MAMMOPLASTY AUGMENTATION  1993: LUMBAR DECOMPRESSION  No date: PB ENLARGE BREAST WITH IMPLANT      Comment:  saline  Social History    Tobacco Use      Smoking status: Never Smoker      Smokeless tobacco: Never Used    Alcohol use: Yes      Comment: occasionally    Drug use: No    Review of patient's family history indicates:  Problem: Other      Relation: Mother          Age of Onset: (Not Specified)  Problem: GI Disease      Relation: Father          Age of Onset: 84          Comment: Complications of cdiff  Problem: Diabetes      Relation: Maternal Grandmother          Age of Onset: (Not Specified)      Current Outpatient Medications: •  methylPREDNISolone (MEDROL DOSEPAK) 4 MG Tablet Therapy Pack, As directed on the packaging label., Disp: 21 Tab, Rfl: 0    Patient was instructed on the use of medications, either prescriptions or OTC and informed on when the appropriate follow up time period should be. In addition, patient was also instructed that should any acute worsening occur that they should notify this clinic asap or call 911.          Review of Systems   Constitutional: Negative.  Negative for chills and fever.   HENT: Positive for congestion. Negative for hearing loss.    Eyes: Negative.  Negative for blurred vision and double vision.   Respiratory: Positive for cough. Negative for hemoptysis.    Cardiovascular: Negative.  Negative for chest pain and palpitations.   Gastrointestinal: Negative.  Negative for heartburn and nausea.   Genitourinary: Negative.  Negative for dysuria.   Musculoskeletal: Negative.  Negative for myalgias.   Skin: Negative.  Negative for rash.   Neurological: Negative.  Negative for dizziness, tingling and headaches.   Endo/Heme/Allergies: Negative.  Does not bruise/bleed easily.   Psychiatric/Behavioral: Negative.  Negative for depression and suicidal ideas.   All other systems reviewed and are negative.          "Objective:     /70 (BP Location: Right arm, Patient Position: Sitting, BP Cuff Size: Adult)   Pulse 62   Temp 36.2 °C (97.2 °F)   Resp 12   Ht 1.702 m (5' 7\")   Wt 66 kg (145 lb 8 oz)   SpO2 97%   BMI 22.79 kg/m²      Physical Exam   Constitutional: She is oriented to person, place, and time. She appears well-developed and well-nourished. No distress.   HENT:   Head: Normocephalic and atraumatic.   Mouth/Throat: Oropharynx is clear and moist. No oropharyngeal exudate.   Eyes: Pupils are equal, round, and reactive to light.   Cardiovascular: Normal rate, regular rhythm, normal heart sounds and intact distal pulses. Exam reveals no gallop and no friction rub.   No murmur heard.  Pulmonary/Chest: Effort normal and breath sounds normal. No respiratory distress. She has no wheezes. She has no rales. She exhibits no tenderness.   Neurological: She is alert and oriented to person, place, and time.   Skin: She is not diaphoretic.   Psychiatric: She has a normal mood and affect. Her behavior is normal. Judgment and thought content normal.   Nursing note and vitals reviewed.              Assessment/Plan:     1. Cough    - methylPREDNISolone (MEDROL DOSEPAK) 4 MG Tablet Therapy Pack; As directed on the packaging label.  Dispense: 21 Tab; Refill: 0  - DX-CHEST-2 VIEWS; Future    "

## 2019-09-06 ENCOUNTER — TELEPHONE (OUTPATIENT)
Dept: MEDICAL GROUP | Facility: PHYSICIAN GROUP | Age: 64
End: 2019-09-06

## 2019-09-06 NOTE — TELEPHONE ENCOUNTER
PATIENT IS CALLING ASKING FOR RESULTS OF CXR.  COMPLETED AT [facility], SCANNED INTO MEDIA.  THANK YOU.

## 2020-09-11 PROBLEM — Z98.890 HISTORY OF ARTHROPLASTY OF LEFT HIP: Status: ACTIVE | Noted: 2020-09-11

## 2020-09-11 PROBLEM — Z96.642 HISTORY OF ARTHROPLASTY OF LEFT HIP: Status: ACTIVE | Noted: 2020-09-11

## 2020-09-11 PROBLEM — Z98.890 HISTORY OF BACK SURGERY: Status: ACTIVE | Noted: 2020-09-11

## 2020-09-11 PROBLEM — M15.9 PRIMARY OSTEOARTHRITIS INVOLVING MULTIPLE JOINTS: Status: ACTIVE | Noted: 2020-09-11

## 2020-09-11 PROBLEM — E78.2 MIXED HYPERLIPIDEMIA: Status: ACTIVE | Noted: 2020-09-11

## 2020-09-11 PROBLEM — Z85.3 HISTORY OF BREAST CANCER: Status: ACTIVE | Noted: 2020-09-11

## 2022-03-16 PROBLEM — R25.2 LEG CRAMPING: Status: ACTIVE | Noted: 2021-07-23

## 2023-02-15 ENCOUNTER — APPOINTMENT (RX ONLY)
Dept: URBAN - METROPOLITAN AREA CLINIC 31 | Facility: CLINIC | Age: 68
Setting detail: DERMATOLOGY
End: 2023-02-15

## 2023-02-15 DIAGNOSIS — H61.03 CHONDRITIS OF EXTERNAL EAR: ICD-10-CM

## 2023-02-15 DIAGNOSIS — L01.01 NON-BULLOUS IMPETIGO: ICD-10-CM

## 2023-02-15 PROBLEM — H61.032 CHONDRITIS OF LEFT EXTERNAL EAR: Status: ACTIVE | Noted: 2023-02-15

## 2023-02-15 PROCEDURE — ? COUNSELING

## 2023-02-15 PROCEDURE — ? PRESCRIPTION

## 2023-02-15 PROCEDURE — ? TREATMENT REGIMEN

## 2023-02-15 PROCEDURE — 99203 OFFICE O/P NEW LOW 30 MIN: CPT

## 2023-02-15 RX ORDER — MUPIROCIN 20 MG/G
OINTMENT TOPICAL QD
Qty: 22 | Refills: 0 | Status: ERX | COMMUNITY
Start: 2023-02-15

## 2023-02-15 RX ADMIN — MUPIROCIN: 20 OINTMENT TOPICAL at 00:00

## 2023-02-15 ASSESSMENT — LOCATION DETAILED DESCRIPTION DERM: LOCATION DETAILED: LEFT INFERIOR HELIX

## 2023-02-15 ASSESSMENT — LOCATION ZONE DERM: LOCATION ZONE: EAR

## 2023-02-15 ASSESSMENT — LOCATION SIMPLE DESCRIPTION DERM: LOCATION SIMPLE: LEFT EAR

## 2023-02-15 NOTE — HPI: SKIN LESION
Is This A New Presentation, Or A Follow-Up?: Skin Lesion
What Type Of Note Output Would You Prefer (Optional)?: Standard Output
Has Your Skin Lesion Been Treated?: not been treated
Additional History: Pt reports she sleeps on that side

## 2023-02-15 NOTE — PROCEDURE: TREATMENT REGIMEN
Detail Level: Zone
Initiate Treatment: Discussed bx for definitive dx or to tx x2wks and f/u if lesion persists bx will be obtained. \\nPt elects to tx x 2wks. \\nmupirocin bid x 2wks pt instructed to also apply to nares and fingernails. Medication instructions reviewed \\nIncludes lesion of concern noted on intake \\nF/u in 2wks at which time we will do a FBE

## 2023-03-22 ENCOUNTER — APPOINTMENT (RX ONLY)
Dept: URBAN - METROPOLITAN AREA CLINIC 31 | Facility: CLINIC | Age: 68
Setting detail: DERMATOLOGY
End: 2023-03-22

## 2023-03-22 DIAGNOSIS — D485 NEOPLASM OF UNCERTAIN BEHAVIOR OF SKIN: ICD-10-CM

## 2023-03-22 DIAGNOSIS — L82.1 OTHER SEBORRHEIC KERATOSIS: ICD-10-CM

## 2023-03-22 DIAGNOSIS — D22 MELANOCYTIC NEVI: ICD-10-CM

## 2023-03-22 DIAGNOSIS — H61.03 CHONDRITIS OF EXTERNAL EAR: ICD-10-CM

## 2023-03-22 DIAGNOSIS — D18.0 HEMANGIOMA: ICD-10-CM

## 2023-03-22 DIAGNOSIS — L81.4 OTHER MELANIN HYPERPIGMENTATION: ICD-10-CM

## 2023-03-22 DIAGNOSIS — Z71.89 OTHER SPECIFIED COUNSELING: ICD-10-CM

## 2023-03-22 PROBLEM — H61.032 CHONDRITIS OF LEFT EXTERNAL EAR: Status: ACTIVE | Noted: 2023-03-22

## 2023-03-22 PROBLEM — D22.71 MELANOCYTIC NEVI OF RIGHT LOWER LIMB, INCLUDING HIP: Status: ACTIVE | Noted: 2023-03-22

## 2023-03-22 PROBLEM — D22.5 MELANOCYTIC NEVI OF TRUNK: Status: ACTIVE | Noted: 2023-03-22

## 2023-03-22 PROBLEM — D22.62 MELANOCYTIC NEVI OF LEFT UPPER LIMB, INCLUDING SHOULDER: Status: ACTIVE | Noted: 2023-03-22

## 2023-03-22 PROBLEM — D48.5 NEOPLASM OF UNCERTAIN BEHAVIOR OF SKIN: Status: ACTIVE | Noted: 2023-03-22

## 2023-03-22 PROBLEM — D22.61 MELANOCYTIC NEVI OF RIGHT UPPER LIMB, INCLUDING SHOULDER: Status: ACTIVE | Noted: 2023-03-22

## 2023-03-22 PROBLEM — D22.72 MELANOCYTIC NEVI OF LEFT LOWER LIMB, INCLUDING HIP: Status: ACTIVE | Noted: 2023-03-22

## 2023-03-22 PROBLEM — D18.01 HEMANGIOMA OF SKIN AND SUBCUTANEOUS TISSUE: Status: ACTIVE | Noted: 2023-03-22

## 2023-03-22 PROCEDURE — 69100 BIOPSY OF EXTERNAL EAR: CPT

## 2023-03-22 PROCEDURE — ? BIOPSY BY SHAVE METHOD

## 2023-03-22 PROCEDURE — ? COUNSELING

## 2023-03-22 PROCEDURE — 99213 OFFICE O/P EST LOW 20 MIN: CPT | Mod: 25

## 2023-03-22 ASSESSMENT — LOCATION DETAILED DESCRIPTION DERM
LOCATION DETAILED: RIGHT CENTRAL MALAR CHEEK
LOCATION DETAILED: RIGHT ANTERIOR DISTAL UPPER ARM
LOCATION DETAILED: LEFT ANTERIOR DISTAL UPPER ARM
LOCATION DETAILED: LEFT RADIAL DORSAL HAND
LOCATION DETAILED: LEFT INFERIOR HELIX
LOCATION DETAILED: LEFT POSTERIOR SHOULDER
LOCATION DETAILED: RIGHT SUPERIOR MEDIAL MIDBACK
LOCATION DETAILED: LEFT SUPERIOR HELIX
LOCATION DETAILED: RIGHT INFERIOR MEDIAL UPPER BACK
LOCATION DETAILED: RIGHT PROXIMAL DORSAL FOREARM
LOCATION DETAILED: RIGHT PROXIMAL CALF
LOCATION DETAILED: RIGHT VENTRAL DISTAL FOREARM
LOCATION DETAILED: RIGHT POSTERIOR SHOULDER
LOCATION DETAILED: LEFT VENTRAL DISTAL FOREARM
LOCATION DETAILED: LEFT ANTERIOR DISTAL THIGH
LOCATION DETAILED: RIGHT ULNAR DORSAL HAND
LOCATION DETAILED: LEFT PROXIMAL DORSAL FOREARM
LOCATION DETAILED: LEFT LATERAL MALAR CHEEK
LOCATION DETAILED: EPIGASTRIC SKIN
LOCATION DETAILED: RIGHT PROXIMAL POSTERIOR UPPER ARM
LOCATION DETAILED: LEFT PROXIMAL POSTERIOR UPPER ARM
LOCATION DETAILED: PERIUMBILICAL SKIN
LOCATION DETAILED: INFERIOR THORACIC SPINE
LOCATION DETAILED: LEFT PROXIMAL CALF
LOCATION DETAILED: RIGHT ANTERIOR DISTAL THIGH

## 2023-03-22 ASSESSMENT — LOCATION SIMPLE DESCRIPTION DERM
LOCATION SIMPLE: LEFT EAR
LOCATION SIMPLE: RIGHT UPPER ARM
LOCATION SIMPLE: ABDOMEN
LOCATION SIMPLE: LEFT FOREARM
LOCATION SIMPLE: LEFT HAND
LOCATION SIMPLE: RIGHT CHEEK
LOCATION SIMPLE: LEFT UPPER ARM
LOCATION SIMPLE: LEFT CALF
LOCATION SIMPLE: LEFT CHEEK
LOCATION SIMPLE: LEFT THIGH
LOCATION SIMPLE: UPPER BACK
LOCATION SIMPLE: LEFT SHOULDER
LOCATION SIMPLE: RIGHT CALF
LOCATION SIMPLE: RIGHT LOWER BACK
LOCATION SIMPLE: RIGHT THIGH
LOCATION SIMPLE: RIGHT SHOULDER
LOCATION SIMPLE: RIGHT HAND
LOCATION SIMPLE: RIGHT UPPER BACK
LOCATION SIMPLE: RIGHT FOREARM

## 2023-03-22 ASSESSMENT — LOCATION ZONE DERM
LOCATION ZONE: LEG
LOCATION ZONE: FACE
LOCATION ZONE: EAR
LOCATION ZONE: ARM
LOCATION ZONE: TRUNK
LOCATION ZONE: HAND

## 2023-04-11 ENCOUNTER — APPOINTMENT (RX ONLY)
Dept: URBAN - METROPOLITAN AREA CLINIC 31 | Facility: CLINIC | Age: 68
Setting detail: DERMATOLOGY
End: 2023-04-11

## 2023-04-11 DIAGNOSIS — L57.0 ACTINIC KERATOSIS: ICD-10-CM

## 2023-04-11 PROCEDURE — ? COUNSELING

## 2023-04-11 PROCEDURE — ? LIQUID NITROGEN

## 2023-04-11 PROCEDURE — ? PATHOLOGY DISCUSSION

## 2023-04-11 PROCEDURE — 17000 DESTRUCT PREMALG LESION: CPT

## 2023-04-11 ASSESSMENT — LOCATION SIMPLE DESCRIPTION DERM: LOCATION SIMPLE: LEFT EAR

## 2023-04-11 ASSESSMENT — LOCATION ZONE DERM: LOCATION ZONE: EAR

## 2023-04-11 ASSESSMENT — LOCATION DETAILED DESCRIPTION DERM: LOCATION DETAILED: LEFT INFERIOR HELIX

## 2023-04-11 NOTE — PROCEDURE: LIQUID NITROGEN
Consent: The patient's consent was obtained including but not limited to risks of crusting, scabbing, blistering, scarring, darker or lighter pigmentary change, recurrence, incomplete removal and infection.
Duration Of Freeze Thaw-Cycle (Seconds): 0
Show Aperture Variable?: Yes
Detail Level: Detailed
Render Post-Care Instructions In Note?: no
Post-Care Instructions: I reviewed with the patient in detail post-care instructions. Patient is to wear sunprotection, and avoid picking at any of the treated lesions. Pt may apply Vaseline to crusted or scabbing areas.

## 2023-12-18 ENCOUNTER — APPOINTMENT (RX ONLY)
Dept: URBAN - METROPOLITAN AREA CLINIC 31 | Facility: CLINIC | Age: 68
Setting detail: DERMATOLOGY
End: 2023-12-18

## 2023-12-18 DIAGNOSIS — L82.0 INFLAMED SEBORRHEIC KERATOSIS: ICD-10-CM

## 2023-12-18 PROCEDURE — ? ADDITIONAL NOTES

## 2023-12-18 PROCEDURE — ? COUNSELING

## 2023-12-18 PROCEDURE — 17110 DESTRUCTION B9 LES UP TO 14: CPT

## 2023-12-18 PROCEDURE — ? LIQUID NITROGEN

## 2023-12-18 ASSESSMENT — LOCATION DETAILED DESCRIPTION DERM: LOCATION DETAILED: RIGHT INFERIOR UPPER BACK

## 2023-12-18 ASSESSMENT — LOCATION ZONE DERM: LOCATION ZONE: TRUNK

## 2023-12-18 ASSESSMENT — LOCATION SIMPLE DESCRIPTION DERM: LOCATION SIMPLE: RIGHT UPPER BACK

## 2024-01-19 ENCOUNTER — APPOINTMENT (RX ONLY)
Dept: URBAN - METROPOLITAN AREA CLINIC 15 | Facility: CLINIC | Age: 69
Setting detail: DERMATOLOGY
End: 2024-01-19

## 2024-01-19 DIAGNOSIS — R21 RASH AND OTHER NONSPECIFIC SKIN ERUPTION: ICD-10-CM | Status: INADEQUATELY CONTROLLED

## 2024-01-19 PROCEDURE — ? PRESCRIPTION MEDICATION MANAGEMENT

## 2024-01-19 PROCEDURE — ? ADDITIONAL NOTES

## 2024-01-19 PROCEDURE — ? PRESCRIPTION

## 2024-01-19 PROCEDURE — ? COUNSELING

## 2024-01-19 PROCEDURE — 99213 OFFICE O/P EST LOW 20 MIN: CPT

## 2024-01-19 RX ORDER — TRIAMCINOLONE ACETONIDE 1 MG/G
OINTMENT TOPICAL BID
Qty: 30 | Refills: 1 | Status: ERX | COMMUNITY
Start: 2024-01-19

## 2024-01-19 RX ADMIN — TRIAMCINOLONE ACETONIDE: 1 OINTMENT TOPICAL at 00:00

## 2024-01-19 ASSESSMENT — LOCATION DETAILED DESCRIPTION DERM: LOCATION DETAILED: LEFT LABIA MINORA

## 2024-01-19 ASSESSMENT — LOCATION ZONE DERM: LOCATION ZONE: VULVA

## 2024-01-19 ASSESSMENT — LOCATION SIMPLE DESCRIPTION DERM: LOCATION SIMPLE: VULVA

## 2024-01-19 NOTE — HPI: RASH
What Type Of Note Output Would You Prefer (Optional)?: Bullet Format
How Severe Is Your Rash?: mild
Is This A New Presentation, Or A Follow-Up?: Rash
Additional History: She states its been present for about 2 months and she was prescribed an ointment- helped but after it came back right away

## 2024-01-19 NOTE — PROCEDURE: PRESCRIPTION MEDICATION MANAGEMENT
Render In Strict Bullet Format?: No
Initiate Treatment: Triamcinalone 2X daily, after use Vaseline. Switch to water wipes
Continue Regimen: Dove sensitive skin bar
Discontinue Regimen: Antibiotic ointment and scented wipes
Detail Level: Zone
Plan: Patient will follow up PRN- If not improved consider patch testing.

## 2024-01-19 NOTE — PROCEDURE: ADDITIONAL NOTES
Additional Notes: Irritation has been present for about 2 months, PCP gave her mupirocin and she states it helps but as soon as she stops using it, it comes right back. No new products.
Render Risk Assessment In Note?: no
Detail Level: Simple